# Patient Record
Sex: MALE | Race: WHITE | NOT HISPANIC OR LATINO | Employment: OTHER | URBAN - METROPOLITAN AREA
[De-identification: names, ages, dates, MRNs, and addresses within clinical notes are randomized per-mention and may not be internally consistent; named-entity substitution may affect disease eponyms.]

---

## 2017-10-06 ENCOUNTER — APPOINTMENT (OUTPATIENT)
Dept: GENERAL RADIOLOGY | Facility: HOSPITAL | Age: 82
End: 2017-10-06

## 2017-10-06 ENCOUNTER — HOSPITAL ENCOUNTER (EMERGENCY)
Facility: HOSPITAL | Age: 82
Discharge: HOME | End: 2017-10-06
Attending: EMERGENCY MEDICINE | Admitting: EMERGENCY MEDICINE

## 2017-10-06 VITALS
OXYGEN SATURATION: 98 % | BODY MASS INDEX: 23.1 KG/M2 | TEMPERATURE: 97.8 F | RESPIRATION RATE: 16 BRPM | SYSTOLIC BLOOD PRESSURE: 129 MMHG | HEART RATE: 63 BPM | WEIGHT: 180 LBS | DIASTOLIC BLOOD PRESSURE: 69 MMHG | HEIGHT: 74 IN

## 2017-10-06 DIAGNOSIS — K59.00 CONSTIPATION, UNSPECIFIED CONSTIPATION TYPE: Primary | ICD-10-CM

## 2017-10-06 PROCEDURE — 99283 EMERGENCY DEPT VISIT LOW MDM: CPT

## 2017-10-06 PROCEDURE — 74022 RADEX COMPL AQT ABD SERIES: CPT

## 2017-10-06 RX ORDER — METOPROLOL SUCCINATE 25 MG/1
25 TABLET, EXTENDED RELEASE ORAL DAILY
COMMUNITY

## 2017-10-06 RX ORDER — BUPROPION HYDROCHLORIDE 300 MG/1
300 TABLET ORAL DAILY
COMMUNITY

## 2017-10-06 RX ORDER — MERCAPTOPURINE 50 MG/1
TABLET ORAL DAILY
COMMUNITY

## 2017-10-06 RX ORDER — LISINOPRIL 5 MG/1
5 TABLET ORAL DAILY
COMMUNITY

## 2017-10-06 RX ORDER — BUTALBITAL, ACETAMINOPHEN AND CAFFEINE 50; 325; 40 MG/1; MG/1; MG/1
1 TABLET ORAL EVERY 4 HOURS PRN
COMMUNITY

## 2017-10-06 RX ORDER — ESOMEPRAZOLE MAGNESIUM 40 MG/1
40 CAPSULE, DELAYED RELEASE ORAL
COMMUNITY

## 2017-10-06 RX ORDER — OLANZAPINE 5 MG/1
5 TABLET ORAL NIGHTLY
COMMUNITY

## 2017-10-06 RX ORDER — ATORVASTATIN CALCIUM 40 MG/1
40 TABLET, FILM COATED ORAL DAILY
COMMUNITY

## 2017-10-06 RX ORDER — PROCHLORPERAZINE MALEATE 5 MG/1
5 TABLET ORAL EVERY 8 HOURS PRN
COMMUNITY

## 2017-10-06 RX ORDER — POLYETHYLENE GLYCOL 3350 17 G/17G
17 POWDER, FOR SOLUTION ORAL DAILY
Qty: 250 G | Refills: 0 | Status: SHIPPED | OUTPATIENT
Start: 2017-10-06

## 2017-10-06 RX ORDER — LORAZEPAM 0.5 MG/1
0.5 TABLET ORAL EVERY 8 HOURS PRN
COMMUNITY

## 2017-10-06 RX ORDER — ESCITALOPRAM OXALATE 10 MG/1
10 TABLET ORAL DAILY
COMMUNITY

## 2017-10-06 RX ORDER — DEXAMETHASONE 6 MG/1
6 TABLET ORAL 2 TIMES DAILY WITH MEALS
COMMUNITY
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
## 2017-10-06 NOTE — DISCHARGE INSTRUCTIONS
Return if worsening abdominal discomfort or any concerns.  Continue taking Colace twice a day.  To taking your MiraLAX daily as well.  Follow-up with your primary care provider